# Patient Record
Sex: FEMALE | Race: BLACK OR AFRICAN AMERICAN | NOT HISPANIC OR LATINO | Employment: UNEMPLOYED | URBAN - METROPOLITAN AREA
[De-identification: names, ages, dates, MRNs, and addresses within clinical notes are randomized per-mention and may not be internally consistent; named-entity substitution may affect disease eponyms.]

---

## 2024-04-18 ENCOUNTER — HOSPITAL ENCOUNTER (EMERGENCY)
Facility: HOSPITAL | Age: 24
Discharge: HOME | End: 2024-04-18
Attending: EMERGENCY MEDICINE
Payer: COMMERCIAL

## 2024-04-18 VITALS
BODY MASS INDEX: 44.36 KG/M2 | OXYGEN SATURATION: 99 % | HEART RATE: 84 BPM | HEIGHT: 66 IN | WEIGHT: 276 LBS | TEMPERATURE: 97.7 F | DIASTOLIC BLOOD PRESSURE: 94 MMHG | RESPIRATION RATE: 18 BRPM | SYSTOLIC BLOOD PRESSURE: 149 MMHG

## 2024-04-18 DIAGNOSIS — D24.2 FIBROADENOMA OF BREAST, LEFT: Primary | ICD-10-CM

## 2024-04-18 LAB
APPEARANCE UR: CLEAR
BILIRUB UR STRIP.AUTO-MCNC: NEGATIVE MG/DL
COLOR UR: NORMAL
GLUCOSE UR STRIP.AUTO-MCNC: NORMAL MG/DL
KETONES UR STRIP.AUTO-MCNC: NEGATIVE MG/DL
LEUKOCYTE ESTERASE UR QL STRIP.AUTO: NEGATIVE
NITRITE UR QL STRIP.AUTO: NEGATIVE
PH UR STRIP.AUTO: 6.5 [PH]
PROT UR STRIP.AUTO-MCNC: NEGATIVE MG/DL
RBC # UR STRIP.AUTO: NEGATIVE /UL
SP GR UR STRIP.AUTO: 1.02
UROBILINOGEN UR STRIP.AUTO-MCNC: NORMAL MG/DL

## 2024-04-18 PROCEDURE — 99283 EMERGENCY DEPT VISIT LOW MDM: CPT

## 2024-04-18 PROCEDURE — 81003 URINALYSIS AUTO W/O SCOPE: CPT | Performed by: EMERGENCY MEDICINE

## 2024-04-18 ASSESSMENT — COLUMBIA-SUICIDE SEVERITY RATING SCALE - C-SSRS
6. HAVE YOU EVER DONE ANYTHING, STARTED TO DO ANYTHING, OR PREPARED TO DO ANYTHING TO END YOUR LIFE?: NO
1. IN THE PAST MONTH, HAVE YOU WISHED YOU WERE DEAD OR WISHED YOU COULD GO TO SLEEP AND NOT WAKE UP?: NO
2. HAVE YOU ACTUALLY HAD ANY THOUGHTS OF KILLING YOURSELF?: NO

## 2024-04-18 NOTE — ED PROVIDER NOTES
HPI   Chief Complaint   Patient presents with    Breast Pain     Pt came to ED with c/o L breast pain. Pt states that she felt a lump in her breast, and states that it has been causing her a lot of pain recently. Pt also believes she has UTI because frequency/urgency, and she states she noticed it after her cycle ended. Denies abd pain, SOB, or chest pain. Pt A&Ox4, ambulates on own.        HPI: []  24-year-old  female no history of high BMI comes in with left breast mass.  She has noticed that for the last few months she has mass left breast but gets worse when she has menstrual period.  She has no nipple discharge or pain or fever or chills no history of breast cancer in herself or family.  She has had a negative mammogram in the past.  Patient denies any abdominal pain nausea diarrhea fever chills cough congestion incontinence seizures syncope or near syncope not pregnant.    Past history: None  Social history: Patient denies current tobacco alcohol drug use.  REVIEW OF SYSTEMS:    GENERAL.: No weight loss, fatigue, anorexia, insomnia, fever.    EYES: No vision loss, double vision, drainage, eye pain.    ENT: No pharyngitis, dry mouth.    CARDIOPULMONARY: No chest pain, palpitations, syncope, near syncope. No shortness of breath, cough, hemoptysis.    GI: No abdominal pain, change in bowel habits, melena, hematemesis, hematochezia, nausea, vomiting, diarrhea.    : No discharge, dysuria, frequency, urgency, hematuria.    MS: No limb pain, joint pain, joint swelling.  Breast: Positive breast lump  SKIN: No rashes.    PSYCH: No depression, anxiety, suicidality, homicidality.    Review of systems is otherwise negative unless stated above or in history of present illness.  Social history, family history, allergies reviewed.  PHYSICAL EXAM:    GENERAL: Vitals noted, no distress. Alert and oriented  x 3. Non-toxic.  High BMI    EENT: TMs clear. Posterior oropharynx unremarkable. No meningismus. No LAD.      NECK: Supple. Nontender. No midline tenderness.     CARDIAC: Regular, rate, rhythm. No murmurs rubs or gallops. No JVD    PULMONARY: Lungs clear bilaterally with good aeration. No wheezes rales or rhonchi. No respiratory distress.     ABDOMEN: Soft, nonsurgical. Nontender. No peritoneal signs. Normoactive bowel sounds. No pulsatile masses.     EXTREMITIES: No peripheral edema. Negative Homans bilaterally, no cords.  2+ bounding pulses well-perfused  Breast exam: Female chaperone present, patient is a palpable lump left breast at 12:00/1 o'clock position, which does go to the enterically fold with no erythema redness no nipple discharge and no puckering of skin.  There is no lymphadenopathy in the axilla.  No asymmetry of the breast.  SKIN: No rash. Intact.     NEURO: No focal neurologic deficits, NIH score of 0. Cranial nerves normal as tested from II through XII.     MEDICAL DECISION MAKING:  Impression: #1 fibroadenoma breast    Plans and MDM: 24-year female comes in with a left breast lump on exam appears to have a fibroadenoma, low suspicion for breast cancer mastitis abscess, nevertheless patient advised urgent option follow with breast clinic for further workup and treatment including if not limited to a mammogram and ultrasound with strict return precaution.                          [place] Coma Scale Score: 15                     Patient History   No past medical history on file.  No past surgical history on file.  No family history on file.  Social History     Tobacco Use    Smoking status: Not on file    Smokeless tobacco: Not on file   Substance Use Topics    Alcohol use: Not on file    Drug use: Not on file       Physical Exam   ED Triage Vitals [04/18/24 0921]   Temperature Heart Rate Respirations BP   36.5 °C (97.7 °F) 78 18 (!) 145/96      Pulse Ox Temp Source Heart Rate Source Patient Position   99 % Temporal -- --      BP Location FiO2 (%)     Left arm --       Physical Exam    ED Course & MDM    ED Course as of 04/18/24 1231   Thu Apr 18, 2024   0949 Chaperone present patient has a palpable lump left breast at 12 and 1 o'clock position minimally tender to palpation, with no obvious mastitis and/or nipple drainage and no lymphadenopathy and/or puckering of the skin most likely fibroadenoma advised follow-up with the breast clinic for further evaluation including mammogram and ultrasound low concern for mastitis or abscess or malignancy. [MT]      ED Course User Index  [MT] Dre Bonilla MD         Diagnoses as of 04/18/24 1231   Fibroadenoma of breast, left       Medical Decision Making      Procedure  Procedures     Dre Bonilla MD  04/18/24 1234

## 2024-05-07 ENCOUNTER — APPOINTMENT (OUTPATIENT)
Dept: PRIMARY CARE | Facility: HOSPITAL | Age: 24
End: 2024-05-07
Payer: COMMERCIAL

## 2024-05-21 ENCOUNTER — APPOINTMENT (OUTPATIENT)
Dept: PRIMARY CARE | Facility: HOSPITAL | Age: 24
End: 2024-05-21
Payer: COMMERCIAL

## 2025-05-22 ENCOUNTER — HOSPITAL ENCOUNTER (EMERGENCY)
Facility: HOSPITAL | Age: 25
Discharge: HOME | End: 2025-05-22
Payer: COMMERCIAL

## 2025-05-22 ENCOUNTER — APPOINTMENT (OUTPATIENT)
Dept: RADIOLOGY | Facility: HOSPITAL | Age: 25
End: 2025-05-22
Payer: COMMERCIAL

## 2025-05-22 VITALS
BODY MASS INDEX: 45.8 KG/M2 | HEART RATE: 80 BPM | DIASTOLIC BLOOD PRESSURE: 83 MMHG | HEIGHT: 66 IN | RESPIRATION RATE: 18 BRPM | TEMPERATURE: 98.6 F | SYSTOLIC BLOOD PRESSURE: 155 MMHG | OXYGEN SATURATION: 99 % | WEIGHT: 285 LBS

## 2025-05-22 DIAGNOSIS — S67.190A CRUSHING INJURY OF RIGHT INDEX FINGER, INITIAL ENCOUNTER: Primary | ICD-10-CM

## 2025-05-22 DIAGNOSIS — S62.660B OPEN NONDISPLACED FRACTURE OF DISTAL PHALANX OF RIGHT INDEX FINGER, INITIAL ENCOUNTER: ICD-10-CM

## 2025-05-22 DIAGNOSIS — S62.660A CLOSED NONDISPLACED FRACTURE OF DISTAL PHALANX OF RIGHT INDEX FINGER, INITIAL ENCOUNTER: ICD-10-CM

## 2025-05-22 PROCEDURE — 90471 IMMUNIZATION ADMIN: CPT | Performed by: SURGERY

## 2025-05-22 PROCEDURE — 90715 TDAP VACCINE 7 YRS/> IM: CPT | Mod: JZ | Performed by: SURGERY

## 2025-05-22 PROCEDURE — 99283 EMERGENCY DEPT VISIT LOW MDM: CPT

## 2025-05-22 PROCEDURE — 73130 X-RAY EXAM OF HAND: CPT | Mod: RT

## 2025-05-22 PROCEDURE — 2500000004 HC RX 250 GENERAL PHARMACY W/ HCPCS (ALT 636 FOR OP/ED): Mod: JZ | Performed by: SURGERY

## 2025-05-22 PROCEDURE — 2500000001 HC RX 250 WO HCPCS SELF ADMINISTERED DRUGS (ALT 637 FOR MEDICARE OP): Performed by: SURGERY

## 2025-05-22 RX ORDER — CEPHALEXIN 500 MG/1
500 CAPSULE ORAL ONCE
Status: COMPLETED | OUTPATIENT
Start: 2025-05-22 | End: 2025-05-22

## 2025-05-22 RX ORDER — IBUPROFEN 400 MG/1
800 TABLET, FILM COATED ORAL ONCE
Status: COMPLETED | OUTPATIENT
Start: 2025-05-22 | End: 2025-05-22

## 2025-05-22 RX ORDER — CEPHALEXIN 500 MG/1
500 CAPSULE ORAL 4 TIMES DAILY
Qty: 40 CAPSULE | Refills: 0 | Status: SHIPPED | OUTPATIENT
Start: 2025-05-22 | End: 2025-06-01

## 2025-05-22 RX ADMIN — CEPHALEXIN 500 MG: 500 CAPSULE ORAL at 21:10

## 2025-05-22 RX ADMIN — IBUPROFEN 800 MG: 400 TABLET, FILM COATED ORAL at 20:42

## 2025-05-22 RX ADMIN — TETANUS TOXOID, REDUCED DIPHTHERIA TOXOID AND ACELLULAR PERTUSSIS VACCINE, ADSORBED 0.5 ML: 5; 2.5; 8; 8; 2.5 SUSPENSION INTRAMUSCULAR at 20:43

## 2025-05-22 ASSESSMENT — PAIN SCALES - GENERAL: PAINLEVEL_OUTOF10: 5 - MODERATE PAIN

## 2025-05-22 ASSESSMENT — COLUMBIA-SUICIDE SEVERITY RATING SCALE - C-SSRS
6. HAVE YOU EVER DONE ANYTHING, STARTED TO DO ANYTHING, OR PREPARED TO DO ANYTHING TO END YOUR LIFE?: NO
2. HAVE YOU ACTUALLY HAD ANY THOUGHTS OF KILLING YOURSELF?: NO
1. IN THE PAST MONTH, HAVE YOU WISHED YOU WERE DEAD OR WISHED YOU COULD GO TO SLEEP AND NOT WAKE UP?: NO

## 2025-05-22 ASSESSMENT — PAIN DESCRIPTION - ORIENTATION: ORIENTATION: RIGHT

## 2025-05-22 ASSESSMENT — PAIN - FUNCTIONAL ASSESSMENT: PAIN_FUNCTIONAL_ASSESSMENT: 0-10

## 2025-05-22 ASSESSMENT — PAIN DESCRIPTION - LOCATION: LOCATION: FINGER (COMMENT WHICH ONE)

## 2025-05-22 NOTE — Clinical Note
Gordon Nj was seen and treated in our emergency department on 5/22/2025.  She may return to work on 05/26/2025.       If you have any questions or concerns, please don't hesitate to call.      Twin Holloway PA-C

## 2025-05-23 NOTE — DISCHARGE INSTRUCTIONS
You have been seen at a [facility] facility.  It was found that you have a fracture of your index finger.  This is classified as an open fracture so you were placed on antibiotics for the next 10 days.  Please use ibuprofen for pain control, inflammation, and swelling.  Please follow-up with your primary care provider in the next 1 to 2 days for further evaluation and routine follow-up.  Please return to the emergency room if having any worsening symptoms.  You are given a referral to follow-up with an orthopedic hand surgeon for further management.  You may also follow-up at the walk-in clinic, information as below.  Please follow-up at the  orthopedic injury clinic at [facility] [place]  [address]  [address]  [place] [address]  [phone]  Open for walk-ins Monday through Friday 8:30 AM through 4 PM

## 2025-05-23 NOTE — ED PROVIDER NOTES
Chief Complaint   Patient presents with    Finger Laceration     Per patient smashed her index finger at work     HPI:   Gordon Nj is an 25 y.o. female presenting to the ED for chief complaint of right index finger injury.  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  She was concerned that the area is not stopped bleeding.  Her injury is around her nailbed.  She reports some numbness.  Does not take anticoagulants.  Last tetanus is unknown.  Denies any other injuries.      RX Allergies[1]:  Medical History[2]  Surgical History[3]  Family History[4]     Physical Exam  Vitals and nursing note reviewed.   Constitutional:       General: She is not in acute distress.     Appearance: She is well-developed.   HENT:      Head: Normocephalic and atraumatic.   Eyes:      Conjunctiva/sclera: Conjunctivae normal.   Cardiovascular:      Rate and Rhythm: Normal rate and regular rhythm.      Heart sounds: No murmur heard.  Pulmonary:      Effort: Pulmonary effort is normal. No respiratory distress.      Breath sounds: Normal breath sounds.   Abdominal:      Palpations: Abdomen is soft.      Tenderness: There is no abdominal tenderness.   Musculoskeletal:         General: No swelling.      Cervical back: Neck supple.   Skin:     General: Skin is warm and dry.      Capillary Refill: Capillary refill takes less than 2 seconds.      Comments: Superficial area of erythema over the distal phalanx of the second finger on the right hand.  Mild bleeding over the cuticle on the nailbed.  Nail is intact.   Neurological:      Mental Status: She is alert.   Psychiatric:         Mood and Affect: Mood normal.        Splint Application    Performed by: Twin Holloway PA-C  Authorized by: Twin Holloway PA-C    Consent:     Consent obtained:  Verbal    Consent given by:  Patient    Risks,  benefits, and alternatives were discussed: yes      Risks discussed:  Discoloration, numbness, pain and swelling    Alternatives discussed:  No treatment and delayed treatment  Pre-procedure details:     Distal neurologic exam:  Normal  Procedure details:     Location:  Finger    Finger location:  R index finger    Strapping: no      Cast type:  Finger    Splint type:  Finger    Supplies:  Prefabricated splint  Post-procedure details:     Distal neurologic exam:  Normal    Procedure completion:  Tolerated well, no immediate complications    Post-procedure imaging: not applicable      VS: As documented in the triage note and EMR flowsheet from this visit were reviewed.    Medical Decision Making: This is a 25-year-old female presenting to the ED for an index finger injury.  She explains that her right index finger got pinched in the patient's wheelchair while she was working this evening.  She sustained a small laceration at the proximal nailbed.  Tendons were intact.  The nail matrix was intact.  She had a small laceration over the distal phalanx it appeared to be along the cuticle of the nail.  There is no subungual hematoma.  The nail matrix is intact.  We did soak the finger in a Hibiclens solution for about 30 minutes.  Her tetanus was updated today and she was started on Keflex.  She was given information to follow-up with orthopedic hand for outpatient management and the information for the orthopedic walk-in clinic if she needs care sooner.  Worker's Comp. paperwork was filled out.  She was discharged in stable condition.  Finger was dressed with a gauze dressing and splinted.  Patient discharged in stable condition.    Counseling: Spoke with the patient and discussed today´s findings, in addition to providing specific details for the plan of care and expected course.  Patient was given the opportunity to ask questions.    Discussed return precautions and importance of follow-up.  Advised to follow-up with  PCP.  I specifically advised to return to the ED for changing or worsening symptoms, new symptoms, complaint specific precautions, and precautions listed on the discharge paperwork.  Educated on the common potential side effects of medications prescribed.    I advised the patient that the emergency evaluation and treatment provided today doesn't end their need for medical care. It is very important that they follow-up with their primary care provider or other specialist as instructed.    The plan of care was mutually agreed upon with the patient. The patient and/or family were given the opportunity to ask questions. All questions asked today in the ED were answered to the best of my ability with today's information.    This report was transcribed using voice recognition software.  Every effort was made to ensure accuracy, however, inadvertently computerized transcription errors may be present.         [1] No Known Allergies  [2] No past medical history on file.  [3] No past surgical history on file.  [4] No family history on file.       Twin Holloway PA-C  05/22/25 [id number]